# Patient Record
Sex: FEMALE | Race: WHITE | URBAN - METROPOLITAN AREA
[De-identification: names, ages, dates, MRNs, and addresses within clinical notes are randomized per-mention and may not be internally consistent; named-entity substitution may affect disease eponyms.]

---

## 2018-08-28 ENCOUNTER — APPOINTMENT (RX ONLY)
Dept: URBAN - METROPOLITAN AREA CLINIC 61 | Facility: CLINIC | Age: 43
Setting detail: DERMATOLOGY
End: 2018-08-28

## 2018-08-28 DIAGNOSIS — B07.8 OTHER VIRAL WARTS: ICD-10-CM

## 2018-08-28 DIAGNOSIS — D22 MELANOCYTIC NEVI: ICD-10-CM

## 2018-08-28 DIAGNOSIS — L84 CORNS AND CALLOSITIES: ICD-10-CM

## 2018-08-28 PROBLEM — D23.72 OTHER BENIGN NEOPLASM OF SKIN OF LEFT LOWER LIMB, INCLUDING HIP: Status: ACTIVE | Noted: 2018-08-28

## 2018-08-28 PROBLEM — D22.4 MELANOCYTIC NEVI OF SCALP AND NECK: Status: ACTIVE | Noted: 2018-08-28

## 2018-08-28 PROCEDURE — 99202 OFFICE O/P NEW SF 15 MIN: CPT

## 2018-08-28 PROCEDURE — ? TREATMENT REGIMEN

## 2018-08-28 PROCEDURE — ? COUNSELING

## 2018-08-28 ASSESSMENT — LOCATION DETAILED DESCRIPTION DERM
LOCATION DETAILED: RIGHT SUPERIOR LATERAL NECK
LOCATION DETAILED: RIGHT PROXIMAL DORSAL THUMB
LOCATION DETAILED: LEFT PLANTAR FOREFOOT OVERLYING 1ST METATARSAL

## 2018-08-28 ASSESSMENT — LOCATION SIMPLE DESCRIPTION DERM
LOCATION SIMPLE: RIGHT THUMB
LOCATION SIMPLE: LEFT PLANTAR SURFACE
LOCATION SIMPLE: NECK

## 2018-08-28 ASSESSMENT — LOCATION ZONE DERM
LOCATION ZONE: NECK
LOCATION ZONE: FINGER
LOCATION ZONE: FEET

## 2018-08-28 ASSESSMENT — TOTAL NUMBER OF VERRUCA VULGARIS: # OF LESIONS?: 1

## 2018-08-28 NOTE — PROCEDURE: REASSURANCE
Include Location In Plan?: Yes
Hide Include Location In Plan Question?: No
Detail Level: Detailed
Detail Level: Simple